# Patient Record
Sex: FEMALE | Race: WHITE | URBAN - METROPOLITAN AREA
[De-identification: names, ages, dates, MRNs, and addresses within clinical notes are randomized per-mention and may not be internally consistent; named-entity substitution may affect disease eponyms.]

---

## 2023-05-10 ENCOUNTER — OFFICE VISIT (OUTPATIENT)
Facility: LOCATION | Age: 83
End: 2023-05-10
Payer: COMMERCIAL

## 2023-05-10 DIAGNOSIS — H04.123 DRY EYE SYNDROME OF BILATERAL LACRIMAL GLANDS: ICD-10-CM

## 2023-05-10 DIAGNOSIS — H26.492 OTHER SECONDARY CATARACT, LEFT EYE: ICD-10-CM

## 2023-05-10 DIAGNOSIS — Z96.1 PRESENCE OF INTRAOCULAR LENS: Primary | ICD-10-CM

## 2023-05-10 DIAGNOSIS — H16.223 KERATOCONJUNCT SICCA, NOT SPECIFIED AS SJOGREN'S, BILATERAL: ICD-10-CM

## 2023-05-10 PROCEDURE — 99213 OFFICE O/P EST LOW 20 MIN: CPT | Performed by: OPTOMETRIST

## 2023-05-10 RX ORDER — LEVOTHYROXINE SODIUM 150 UG/1
CAPSULE ORAL AS DIRECTED
Qty: 90 | Refills: 0 | Status: ACTIVE
Start: 2023-05-10

## 2023-05-10 ASSESSMENT — VISUAL ACUITY
OD: 20/20
OS: 20/20

## 2023-05-10 ASSESSMENT — INTRAOCULAR PRESSURE
OD: 14
OS: 15

## 2023-05-10 NOTE — IMPRESSION/PLAN
Impression: This is not visually significant at this time. Trace PCO OS, but not visually significant at this time. Plan: No YAG laser indicated at this time, will revisit when patient is bothered.

## 2023-05-10 NOTE — IMPRESSION/PLAN
Impression: Dry eye syndrome of bilateral lacrimal glands: H04.123. Examination revealed dry eye syndrome secondary to tear deficiencies. Today: Trace SPK OU Plan: Advised patient to use OTC preservative-free artificial tears several times daily. Patient to start the use of AT's PRN-QID OU, hot compresses QD OU, and nighttime tarsha QHS OU. RTC in 12 months for reevaluation with Dr. Pina Butts.

## 2023-05-10 NOTE — IMPRESSION/PLAN
Impression: Keratoconjunct sicca, not specified as Sjogren's, bilateral: N86.593. Plan: Refer to plan above.

## 2023-05-10 NOTE — IMPRESSION/PLAN
Impression: S/P YAG PC OD. Plan: Will continue to monitor. 
Patient to follow up yearly for [facility].

## 2025-05-14 ENCOUNTER — OFFICE VISIT (OUTPATIENT)
Facility: LOCATION | Age: 85
End: 2025-05-14
Payer: COMMERCIAL

## 2025-05-14 DIAGNOSIS — H16.223 KERATOCONJUNCT SICCA, NOT SPECIFIED AS SJOGREN'S, BILATERAL: ICD-10-CM

## 2025-05-14 DIAGNOSIS — H26.492 OTHER SECONDARY CATARACT, LEFT EYE: Primary | ICD-10-CM

## 2025-05-14 DIAGNOSIS — H04.123 DRY EYE SYNDROME OF BILATERAL LACRIMAL GLANDS: ICD-10-CM

## 2025-05-14 PROCEDURE — 99214 OFFICE O/P EST MOD 30 MIN: CPT | Performed by: OPTOMETRIST

## 2025-05-14 RX ORDER — FENOFIBRATE 160 MG/1
160 MG TABLET, FILM COATED ORAL AS DIRECTED
Qty: 90 | Refills: 0 | Status: ACTIVE
Start: 2025-05-14

## 2025-05-14 ASSESSMENT — VISUAL ACUITY
OD: 20/30
OS: 20/30

## 2025-05-14 ASSESSMENT — INTRAOCULAR PRESSURE
OS: 17
OD: 17